# Patient Record
Sex: MALE | Race: WHITE | Employment: STUDENT | URBAN - METROPOLITAN AREA
[De-identification: names, ages, dates, MRNs, and addresses within clinical notes are randomized per-mention and may not be internally consistent; named-entity substitution may affect disease eponyms.]

---
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
## 2025-01-17 ENCOUNTER — TELEPHONE (OUTPATIENT)
Dept: PRIMARY CARE | Facility: CLINIC | Age: 25
End: 2025-01-17

## 2025-01-17 ENCOUNTER — APPOINTMENT (OUTPATIENT)
Dept: PRIMARY CARE | Facility: CLINIC | Age: 25
End: 2025-01-17
Payer: COMMERCIAL

## 2025-01-17 NOTE — TELEPHONE ENCOUNTER
Due to linette not in office today 01/17/2025 called and left vm for patient to reschedule on phone number

## 2025-01-20 ENCOUNTER — APPOINTMENT (OUTPATIENT)
Dept: PRIMARY CARE | Facility: CLINIC | Age: 25
End: 2025-01-20
Payer: COMMERCIAL

## 2025-01-20 VITALS
BODY MASS INDEX: 38.89 KG/M2 | HEIGHT: 74 IN | TEMPERATURE: 98 F | SYSTOLIC BLOOD PRESSURE: 158 MMHG | WEIGHT: 303 LBS | RESPIRATION RATE: 16 BRPM | DIASTOLIC BLOOD PRESSURE: 80 MMHG | HEART RATE: 75 BPM | OXYGEN SATURATION: 98 %

## 2025-01-20 DIAGNOSIS — F41.9 ANXIETY: ICD-10-CM

## 2025-01-20 DIAGNOSIS — I10 HYPERTENSION, UNSPECIFIED TYPE: Primary | ICD-10-CM

## 2025-01-20 DIAGNOSIS — Z13.9 SCREENING DUE: ICD-10-CM

## 2025-01-20 DIAGNOSIS — E66.812 CLASS 2 OBESITY WITH BODY MASS INDEX (BMI) OF 38.0 TO 38.9 IN ADULT, UNSPECIFIED OBESITY TYPE, UNSPECIFIED WHETHER SERIOUS COMORBIDITY PRESENT: ICD-10-CM

## 2025-01-20 PROCEDURE — 99203 OFFICE O/P NEW LOW 30 MIN: CPT | Performed by: NURSE PRACTITIONER

## 2025-01-20 RX ORDER — HYDROXYZINE HYDROCHLORIDE 10 MG/1
10 TABLET, FILM COATED ORAL 3 TIMES DAILY PRN
Qty: 30 TABLET | Refills: 0 | Status: SHIPPED | OUTPATIENT
Start: 2025-01-20 | End: 2025-02-19

## 2025-01-20 RX ORDER — ESCITALOPRAM OXALATE 10 MG/1
10 TABLET ORAL DAILY
Qty: 30 TABLET | Refills: 0 | Status: SHIPPED | OUTPATIENT
Start: 2025-01-20 | End: 2025-02-19

## 2025-01-20 RX ORDER — LOSARTAN POTASSIUM 25 MG/1
25 TABLET ORAL DAILY
Qty: 30 TABLET | Refills: 0 | Status: SHIPPED | OUTPATIENT
Start: 2025-01-20 | End: 2025-02-19

## 2025-01-20 ASSESSMENT — ENCOUNTER SYMPTOMS
FATIGUE: 0
POLYDIPSIA: 0
ABDOMINAL PAIN: 0
SORE THROAT: 0
SINUS PRESSURE: 0
VOMITING: 0
WHEEZING: 0
DIARRHEA: 0
LIGHT-HEADEDNESS: 0
EYE REDNESS: 0
FLANK PAIN: 0
POLYPHAGIA: 0
PALPITATIONS: 0
IRRITABILITY: 1
DIZZINESS: 0
SLEEP DISTURBANCE: 0
CHILLS: 0
DYSPHORIC MOOD: 0
NERVOUS/ANXIOUS: 1
BACK PAIN: 0
DYSURIA: 0
SINUS PAIN: 0
DEPRESSED MOOD: 0
SHORTNESS OF BREATH: 0
HEADACHES: 0
FEVER: 0
COUGH: 0
INSOMNIA: 0
DECREASED CONCENTRATION: 1
NAUSEA: 0
WEAKNESS: 0
DEPRESSION: 0
CONSTIPATION: 0

## 2025-01-20 NOTE — PROGRESS NOTES
Subjective   Patient ID: Darrell Velarde is a 24 y.o. male who presents for Establish Care.    Patient is being seen today to establish care and would like to discuss starting anxiety medication.     Anxiety  Presents for initial visit. Onset was 1 to 5 years ago. The problem has been gradually worsening. Symptoms include decreased concentration, excessive worry, irritability and nervous/anxious behavior. Patient reports no chest pain, depressed mood, dizziness, insomnia, nausea, palpitations or shortness of breath. Symptoms occur most days. The severity of symptoms is interfering with daily activities. The symptoms are aggravated by family issues. The quality of sleep is good.     Risk factors include a major life event and prior traumatic experience. His past medical history is significant for anxiety/panic attacks. Past treatments include nothing.        Review of Systems   Constitutional:  Positive for irritability. Negative for chills, fatigue and fever.   HENT:  Negative for congestion, ear pain, sinus pressure, sinus pain and sore throat.    Eyes:  Negative for redness and visual disturbance.   Respiratory:  Negative for cough, shortness of breath and wheezing.    Cardiovascular:  Negative for chest pain, palpitations and leg swelling.   Gastrointestinal:  Negative for abdominal pain, constipation, diarrhea, nausea and vomiting.   Endocrine: Negative for polydipsia, polyphagia and polyuria.   Genitourinary:  Negative for dysuria, flank pain, genital sores, penile discharge, scrotal swelling, testicular pain and urgency.   Musculoskeletal:  Negative for back pain.   Skin:  Negative for rash.   Neurological:  Negative for dizziness, weakness, light-headedness and headaches.   Psychiatric/Behavioral:  Positive for decreased concentration. Negative for dysphoric mood and sleep disturbance. The patient is nervous/anxious. The patient does not have insomnia.        Objective   /80 (BP Location: Left arm,  "Patient Position: Sitting, BP Cuff Size: Large adult)   Pulse 75   Temp 36.7 °C (98 °F) (Temporal)   Resp 16   Ht 1.88 m (6' 2\")   Wt 137 kg (303 lb)   SpO2 98%   BMI 38.90 kg/m²   BP recheck 160/90    Physical Exam  Vitals and nursing note reviewed.   Constitutional:       General: He is not in acute distress.     Appearance: Normal appearance. He is obese.   HENT:      Head: Normocephalic and atraumatic.      Right Ear: Tympanic membrane normal.      Left Ear: Tympanic membrane normal.      Nose: Nose normal. No congestion.      Mouth/Throat:      Mouth: Mucous membranes are moist.      Pharynx: Oropharynx is clear. No posterior oropharyngeal erythema.   Eyes:      Conjunctiva/sclera: Conjunctivae normal.      Pupils: Pupils are equal, round, and reactive to light.   Cardiovascular:      Rate and Rhythm: Normal rate and regular rhythm.      Heart sounds: Normal heart sounds.   Pulmonary:      Effort: Pulmonary effort is normal.      Breath sounds: Normal breath sounds.   Abdominal:      General: Abdomen is flat. Bowel sounds are normal.      Palpations: Abdomen is soft. There is no mass.      Tenderness: There is no abdominal tenderness. There is no right CVA tenderness, left CVA tenderness or guarding.   Musculoskeletal:      Right lower leg: No edema.      Left lower leg: No edema.   Lymphadenopathy:      Cervical: No cervical adenopathy.   Skin:     General: Skin is warm and dry.   Neurological:      Mental Status: He is oriented to person, place, and time.   Psychiatric:         Mood and Affect: Mood normal.         Behavior: Behavior normal.         Assessment/Plan   Problem List Items Addressed This Visit    None  Visit Diagnoses         Codes    Hypertension, unspecified type    -  Primary I10    Relevant Medications    losartan (Cozaar) 25 mg tablet    Other Relevant Orders    Comprehensive Metabolic Panel    CBC and Auto Differential    Anxiety     F41.9    Relevant Medications    escitalopram " (Lexapro) 10 mg tablet    hydrOXYzine HCL (Atarax) 10 mg tablet    Screening due     Z13.9    Relevant Orders    Lipid panel    Tsh With Reflex To Free T4 If Abnormal    Class 2 obesity with body mass index (BMI) of 38.0 to 38.9 in adult, unspecified obesity type, unspecified whether serious comorbidity present     E66.812, Z68.38        Anxiety: Start escitalopram daily. Hydroxyzine as needed. Educated on medications.  Recommended therapy- patient declined at this time but will think about it for the future.     HTN: /90 on recheck today. Encouraged low sodium diet and regular exercise. Start losartan daily. Check labs.     Follow up in 2 weeks for recheck, or sooner with any additional concerns.

## 2025-01-21 ENCOUNTER — APPOINTMENT (OUTPATIENT)
Dept: PRIMARY CARE | Facility: CLINIC | Age: 25
End: 2025-01-21
Payer: COMMERCIAL

## 2025-01-21 ENCOUNTER — LAB (OUTPATIENT)
Dept: LAB | Facility: LAB | Age: 25
End: 2025-01-21
Payer: COMMERCIAL

## 2025-01-21 DIAGNOSIS — I10 HYPERTENSION, UNSPECIFIED TYPE: ICD-10-CM

## 2025-01-21 DIAGNOSIS — Z13.9 SCREENING DUE: ICD-10-CM

## 2025-01-21 LAB
ALBUMIN SERPL BCP-MCNC: 4.8 G/DL (ref 3.4–5)
ALP SERPL-CCNC: 32 U/L (ref 33–120)
ALT SERPL W P-5'-P-CCNC: 23 U/L (ref 10–52)
ANION GAP SERPL CALC-SCNC: 9 MMOL/L (ref 10–20)
AST SERPL W P-5'-P-CCNC: 25 U/L (ref 9–39)
BASOPHILS # BLD AUTO: 0.04 X10*3/UL (ref 0–0.1)
BASOPHILS NFR BLD AUTO: 0.6 %
BILIRUB SERPL-MCNC: 0.8 MG/DL (ref 0–1.2)
BUN SERPL-MCNC: 13 MG/DL (ref 6–23)
CALCIUM SERPL-MCNC: 9.7 MG/DL (ref 8.6–10.3)
CHLORIDE SERPL-SCNC: 104 MMOL/L (ref 98–107)
CHOLEST SERPL-MCNC: 145 MG/DL (ref 0–199)
CHOLESTEROL/HDL RATIO: 2.4
CO2 SERPL-SCNC: 32 MMOL/L (ref 21–32)
CREAT SERPL-MCNC: 0.94 MG/DL (ref 0.5–1.3)
EGFRCR SERPLBLD CKD-EPI 2021: >90 ML/MIN/1.73M*2
EOSINOPHIL # BLD AUTO: 0.27 X10*3/UL (ref 0–0.7)
EOSINOPHIL NFR BLD AUTO: 4.2 %
ERYTHROCYTE [DISTWIDTH] IN BLOOD BY AUTOMATED COUNT: 11.6 % (ref 11.5–14.5)
GLUCOSE SERPL-MCNC: 93 MG/DL (ref 74–99)
HCT VFR BLD AUTO: 43.7 % (ref 41–52)
HDLC SERPL-MCNC: 60.1 MG/DL
HGB BLD-MCNC: 15.7 G/DL (ref 13.5–17.5)
IMM GRANULOCYTES # BLD AUTO: 0.01 X10*3/UL (ref 0–0.7)
IMM GRANULOCYTES NFR BLD AUTO: 0.2 % (ref 0–0.9)
LDLC SERPL CALC-MCNC: 65 MG/DL
LYMPHOCYTES # BLD AUTO: 3.19 X10*3/UL (ref 1.2–4.8)
LYMPHOCYTES NFR BLD AUTO: 49.1 %
MCH RBC QN AUTO: 30.6 PG (ref 26–34)
MCHC RBC AUTO-ENTMCNC: 35.9 G/DL (ref 32–36)
MCV RBC AUTO: 85 FL (ref 80–100)
MONOCYTES # BLD AUTO: 0.44 X10*3/UL (ref 0.1–1)
MONOCYTES NFR BLD AUTO: 6.8 %
NEUTROPHILS # BLD AUTO: 2.55 X10*3/UL (ref 1.2–7.7)
NEUTROPHILS NFR BLD AUTO: 39.1 %
NON HDL CHOLESTEROL: 85 MG/DL (ref 0–149)
NRBC BLD-RTO: 0 /100 WBCS (ref 0–0)
PLATELET # BLD AUTO: 183 X10*3/UL (ref 150–450)
POTASSIUM SERPL-SCNC: 4.7 MMOL/L (ref 3.5–5.3)
PROT SERPL-MCNC: 7.1 G/DL (ref 6.4–8.2)
RBC # BLD AUTO: 5.13 X10*6/UL (ref 4.5–5.9)
SODIUM SERPL-SCNC: 140 MMOL/L (ref 136–145)
TRIGL SERPL-MCNC: 98 MG/DL (ref 0–114)
TSH SERPL-ACNC: 2.83 MIU/L (ref 0.44–3.98)
VLDL: 20 MG/DL (ref 0–40)
WBC # BLD AUTO: 6.5 X10*3/UL (ref 4.4–11.3)

## 2025-01-21 PROCEDURE — 84443 ASSAY THYROID STIM HORMONE: CPT

## 2025-01-21 PROCEDURE — 85025 COMPLETE CBC W/AUTO DIFF WBC: CPT

## 2025-01-21 PROCEDURE — 80061 LIPID PANEL: CPT

## 2025-01-21 PROCEDURE — 80053 COMPREHEN METABOLIC PANEL: CPT

## 2025-02-03 ENCOUNTER — APPOINTMENT (OUTPATIENT)
Dept: PRIMARY CARE | Facility: CLINIC | Age: 25
End: 2025-02-03
Payer: COMMERCIAL

## 2025-02-03 VITALS
WEIGHT: 295 LBS | BODY MASS INDEX: 37.86 KG/M2 | SYSTOLIC BLOOD PRESSURE: 132 MMHG | DIASTOLIC BLOOD PRESSURE: 84 MMHG | HEIGHT: 74 IN | OXYGEN SATURATION: 99 % | TEMPERATURE: 97.9 F | RESPIRATION RATE: 16 BRPM | HEART RATE: 58 BPM

## 2025-02-03 DIAGNOSIS — F41.9 ANXIETY: Primary | ICD-10-CM

## 2025-02-03 DIAGNOSIS — E66.812 CLASS 2 OBESITY WITH BODY MASS INDEX (BMI) OF 37.0 TO 37.9 IN ADULT, UNSPECIFIED OBESITY TYPE, UNSPECIFIED WHETHER SERIOUS COMORBIDITY PRESENT: ICD-10-CM

## 2025-02-03 DIAGNOSIS — I10 HYPERTENSION, UNSPECIFIED TYPE: ICD-10-CM

## 2025-02-03 PROCEDURE — 99213 OFFICE O/P EST LOW 20 MIN: CPT | Performed by: NURSE PRACTITIONER

## 2025-02-03 RX ORDER — ESCITALOPRAM OXALATE 10 MG/1
10 TABLET ORAL DAILY
Qty: 30 TABLET | Refills: 2 | Status: SHIPPED | OUTPATIENT
Start: 2025-02-03 | End: 2025-05-04

## 2025-02-03 ASSESSMENT — ENCOUNTER SYMPTOMS
DIZZINESS: 0
FLANK PAIN: 0
SINUS PAIN: 0
HEADACHES: 0
WEAKNESS: 0
HYPERTENSION: 1
DYSURIA: 0
EYE REDNESS: 0
BACK PAIN: 0
IRRITABILITY: 0
POLYDIPSIA: 0
VOMITING: 0
PALPITATIONS: 0
DYSPHORIC MOOD: 0
SORE THROAT: 0
FEVER: 0
WHEEZING: 0
CONSTIPATION: 0
SLEEP DISTURBANCE: 0
COUGH: 0
DIARRHEA: 0
SINUS PRESSURE: 0
FATIGUE: 0
POLYPHAGIA: 0
NAUSEA: 0
LIGHT-HEADEDNESS: 0
SHORTNESS OF BREATH: 0
CHILLS: 0
DECREASED CONCENTRATION: 0
ABDOMINAL PAIN: 0
NERVOUS/ANXIOUS: 0

## 2025-03-18 ENCOUNTER — PATIENT MESSAGE (OUTPATIENT)
Dept: PRIMARY CARE | Facility: CLINIC | Age: 25
End: 2025-03-18
Payer: COMMERCIAL

## 2025-03-18 DIAGNOSIS — F41.9 ANXIETY: ICD-10-CM

## 2025-03-18 RX ORDER — ESCITALOPRAM OXALATE 10 MG/1
10 TABLET ORAL DAILY
Qty: 30 TABLET | Refills: 2 | Status: SHIPPED | OUTPATIENT
Start: 2025-03-18 | End: 2025-06-16

## 2025-05-05 ENCOUNTER — APPOINTMENT (OUTPATIENT)
Dept: PRIMARY CARE | Facility: CLINIC | Age: 25
End: 2025-05-05
Payer: COMMERCIAL

## 2025-05-09 ENCOUNTER — OFFICE VISIT (OUTPATIENT)
Dept: PRIMARY CARE | Facility: CLINIC | Age: 25
End: 2025-05-09
Payer: COMMERCIAL

## 2025-05-09 VITALS
SYSTOLIC BLOOD PRESSURE: 142 MMHG | TEMPERATURE: 98 F | BODY MASS INDEX: 40.43 KG/M2 | WEIGHT: 315 LBS | OXYGEN SATURATION: 97 % | RESPIRATION RATE: 16 BRPM | HEART RATE: 71 BPM | HEIGHT: 74 IN | DIASTOLIC BLOOD PRESSURE: 94 MMHG

## 2025-05-09 DIAGNOSIS — F41.9 ANXIETY: Primary | ICD-10-CM

## 2025-05-09 DIAGNOSIS — E66.813 CLASS 3 SEVERE OBESITY WITH BODY MASS INDEX (BMI) OF 40.0 TO 44.9 IN ADULT, UNSPECIFIED OBESITY TYPE, UNSPECIFIED WHETHER SERIOUS COMORBIDITY PRESENT: ICD-10-CM

## 2025-05-09 DIAGNOSIS — R03.0 WHITE COAT SYNDROME WITHOUT HYPERTENSION: ICD-10-CM

## 2025-05-09 PROCEDURE — 99214 OFFICE O/P EST MOD 30 MIN: CPT | Performed by: NURSE PRACTITIONER

## 2025-05-09 ASSESSMENT — ENCOUNTER SYMPTOMS
POLYDIPSIA: 0
DYSPHORIC MOOD: 0
NERVOUS/ANXIOUS: 0
DYSURIA: 0
LIGHT-HEADEDNESS: 0
NAUSEA: 0
SINUS PRESSURE: 0
COUGH: 0
CONSTIPATION: 0
SORE THROAT: 0
EYE REDNESS: 0
DIZZINESS: 0
VOMITING: 0
FATIGUE: 0
SLEEP DISTURBANCE: 0
PALPITATIONS: 0
FEVER: 0
WEAKNESS: 0
WHEEZING: 0
POLYPHAGIA: 0
BACK PAIN: 0
SINUS PAIN: 0
DECREASED CONCENTRATION: 0
DIARRHEA: 0
SHORTNESS OF BREATH: 0
HEADACHES: 0
FLANK PAIN: 0
CHILLS: 0
ABDOMINAL PAIN: 0

## 2025-05-09 NOTE — PROGRESS NOTES
Subjective   Patient ID: Darrell Velarde is a 24 y.o. male who presents for Hypertension.    Patient is being seen today for follow up on his elevated blood pressure, he denies having any concerns.       Hypertension  This is a new problem. The problem has been gradually improving since onset. The problem is controlled. Associated symptoms include anxiety. Pertinent negatives include no chest pain, headaches, palpitations, peripheral edema or shortness of breath. Past treatments include angiotensin blockers. Compliance problems include medication side effects.      Anxiety  Presents for follow-up visit. Patient reports no chest pain, decreased concentration, dizziness, excessive worry, irritability, nausea, nervous/anxious behavior, palpitations or shortness of breath. Symptoms occur rarely. The severity of symptoms is mild. The quality of sleep is good.    Patient feels that lexapro is working very well to control his anxiety.   He has been working out and watching his diet, he is still having a hard time losing weight.     He has checked his BP at home, most readings 120s/70s    Review of Systems   Constitutional:  Negative for chills, fatigue and fever.   HENT:  Negative for congestion, ear pain, sinus pressure, sinus pain and sore throat.    Eyes:  Negative for redness and visual disturbance.   Respiratory:  Negative for cough, shortness of breath and wheezing.    Cardiovascular:  Negative for chest pain, palpitations and leg swelling.   Gastrointestinal:  Negative for abdominal pain, constipation, diarrhea, nausea and vomiting.   Endocrine: Negative for polydipsia, polyphagia and polyuria.   Genitourinary:  Negative for dysuria, flank pain, genital sores, penile discharge, scrotal swelling, testicular pain and urgency.   Musculoskeletal:  Negative for back pain.   Skin:  Negative for rash.   Neurological:  Negative for dizziness, weakness, light-headedness and headaches.   Psychiatric/Behavioral:  Negative for  "decreased concentration, dysphoric mood and sleep disturbance. The patient is not nervous/anxious.        Objective   BP (!) 142/94 (BP Location: Left arm, Patient Position: Sitting, BP Cuff Size: Large adult)   Pulse 71   Temp 36.7 °C (98 °F) (Temporal)   Resp 16   Ht 1.88 m (6' 2\")   Wt 144 kg (318 lb)   SpO2 97%   BMI 40.83 kg/m²       Physical Exam  Vitals and nursing note reviewed.   Constitutional:       General: He is not in acute distress.     Appearance: Normal appearance. He is obese.   Cardiovascular:      Rate and Rhythm: Normal rate and regular rhythm.      Heart sounds: Normal heart sounds.   Pulmonary:      Effort: Pulmonary effort is normal.      Breath sounds: Normal breath sounds.   Musculoskeletal:      Right lower leg: No edema.      Left lower leg: No edema.   Skin:     General: Skin is warm and dry.   Neurological:      Mental Status: He is alert.   Psychiatric:         Mood and Affect: Mood normal.         Behavior: Behavior normal.         Assessment/Plan   Problem List Items Addressed This Visit    None  Visit Diagnoses         Codes      Anxiety    -  Primary F41.9      White coat syndrome without hypertension     R03.0      Class 3 severe obesity with body mass index (BMI) of 40.0 to 44.9 in adult, unspecified obesity type, unspecified whether serious comorbidity present     E66.813, Z68.41          Anxiety: Stable. Continue with lexapro.  White coat/HTN:  Recommended continue checking BP at home and follow up with elevated readings (<140/<90)  Discussed healthy diet/regular exercise.  Follow up in 4 months for recheck, or sooner with any additional concerns.            "

## 2025-07-21 ENCOUNTER — OFFICE VISIT (OUTPATIENT)
Dept: URGENT CARE | Age: 25
End: 2025-07-21
Payer: COMMERCIAL

## 2025-07-21 VITALS
SYSTOLIC BLOOD PRESSURE: 150 MMHG | OXYGEN SATURATION: 98 % | DIASTOLIC BLOOD PRESSURE: 100 MMHG | WEIGHT: 315 LBS | TEMPERATURE: 98.8 F | BODY MASS INDEX: 40.83 KG/M2 | RESPIRATION RATE: 17 BRPM | HEART RATE: 66 BPM

## 2025-07-21 DIAGNOSIS — I10 ESSENTIAL HYPERTENSION, BENIGN: Primary | ICD-10-CM

## 2025-07-21 DIAGNOSIS — J06.9 ACUTE URI: ICD-10-CM

## 2025-07-21 NOTE — PATIENT INSTRUCTIONS
Acute URI/Cough:  - Good oral hydration; avoid milk products   - Major's Vapor rub; humidifier; warm showers   - Take medications as prescribed   - Avoid triggers as much as possible   - Advised on s/s to seek emergent care for   - f/u with PCP in the next 3-5 days if no better     High Blood Pressure:  - Improved reading with manual check  - Has apt next month with PCP for repeat check  - Limit decongestant use, was taking OTC flu medication since he has been sick and advised him most likely has decongestant in it and causing BP to go up; monitor BP at home while on cough medication   - Monitor blood pressure closely    - Denies any associated symptoms with elevation in bp   - Advised on s/s to seek emergent care for   - Advised on dangers of uncontrolled HTN   - Encourage wt loss and lifestyle modifications   - DASH diet, low salt diet, increase exercise, walking at least 30 min daily is good

## 2025-07-21 NOTE — PROGRESS NOTES
"Subjective   Patient ID: Darrell Velarde is a 24 y.o. male. They present today with a chief complaint of No chief complaint on file..    History of Present Illness  Pt presents to the  with the c/o cough and congestion for the last 5 days. No fever, no sob, cp or pain with deep inspiration. No other associated symptoms or concerns to address at this time. Has been taking over the counter medications with minimal improvement in his symptoms.   Noted elevation in BP. BP currently being monitored by PCP. Per patient after getting  states \"I have put on a few pounds, I am working with my PCP to bring it down but had reaction to first medication.\" Pt states he is working out 2 hours a day is not eating fast food and trying to loose wt. Denies CP, lower ext swelling or SOB.       Illness      Past Medical History  Allergies as of 07/21/2025 - Reviewed 05/09/2025   Allergen Reaction Noted    Losartan Dizziness and Nausea/vomiting 02/03/2025       Prescriptions Prior to Admission[1]     Medical History[2]    Surgical History[3]     reports that he has never smoked. He has never used smokeless tobacco. He reports that he does not use drugs.    Review of Systems  Review of Systems  10 point ROS completed and all are negative other than what is stated in the current HPI                               Objective    There were no vitals filed for this visit.  No LMP for male patient.    Physical Exam  Vitals and nursing note reviewed.   Constitutional:       Appearance: Normal appearance. He is not ill-appearing or toxic-appearing.   HENT:      Nose: Congestion and rhinorrhea present.      Mouth/Throat:      Mouth: Mucous membranes are moist.      Pharynx: No oropharyngeal exudate or posterior oropharyngeal erythema.      Comments: (+)Postnasal discharge    Cardiovascular:      Rate and Rhythm: Normal rate and regular rhythm.   Pulmonary:      Effort: Pulmonary effort is normal.      Breath sounds: Normal breath sounds. "     Musculoskeletal:      Cervical back: No tenderness.   Lymphadenopathy:      Cervical: No cervical adenopathy.     Skin:     General: Skin is warm and dry.      Capillary Refill: Capillary refill takes less than 2 seconds.      Findings: No rash.     Neurological:      Mental Status: He is alert and oriented to person, place, and time.     Psychiatric:         Behavior: Behavior normal.         Procedures    Point of Care Test & Imaging Results from this visit  No results found for this visit on 07/21/25.   Imaging  No results found.    Cardiology, Vascular, and Other Imaging  No other imaging results found for the past 2 days      Diagnostic study results (if any) were reviewed by ALICIA Cabrera.    Assessment/Plan   Allergies, medications, history, and pertinent labs/EKGs/Imaging reviewed by ALICIA Cabrera.     Medical Decision Making  Acute URI/Cough:  - Good oral hydration; avoid milk products   - Major's Vapor rub; humidifier; warm showers   - Take medications as prescribed   - Avoid triggers as much as possible   - Advised on s/s to seek emergent care for   - f/u with PCP in the next 3-5 days if no better     High Blood Pressure:  - Improved reading with manual check  - Has apt next month with PCP for repeat check  - Limit decongestant use, was taking OTC flu medication since he has been sick and advised him most likely has decongestant in it and causing BP to go up; monitor BP at home while on cough medication   - Monitor blood pressure closely    - Denies any associated symptoms with elevation in bp   - Advised on s/s to seek emergent care for   - Advised on dangers of uncontrolled HTN   - Encourage wt loss and lifestyle modifications   - DASH diet, low salt diet, increase exercise, walking at least 30 min daily is good     At time of discharge patient was clinically well-appearing and HDS for outpatient management. The patient and/or family was educated regarding diagnosis,  supportive care, OTC and Rx medications. The patient and/or family was given the opportunity to ask questions prior to discharge.  They verbalized understanding of my discussion of the plans for treatment, expected course, indications to return to  or seek further evaluation in ED, and the need for timely follow up as directed.   They were provided with a work/school excuse if requested.  AVS provided to patient.  All questions were answered and the patient verbalized understanding of the plan of care for today.       Orders and Diagnoses  There are no diagnoses linked to this encounter.    Medical Admin Record      Patient disposition: Home    Electronically signed by ALICIA Cabrera  8:18 AM           [1] (Not in a hospital admission)  [2] No past medical history on file.  [3]   Past Surgical History:  Procedure Laterality Date    OTHER SURGICAL HISTORY  09/17/2019    Tonsillectomy with adenoidectomy    OTHER SURGICAL HISTORY  09/17/2019    Ear pressure equalization tube insertion bilateral

## 2025-08-04 ENCOUNTER — OFFICE VISIT (OUTPATIENT)
Dept: URGENT CARE | Age: 25
End: 2025-08-04
Payer: COMMERCIAL

## 2025-08-04 VITALS
SYSTOLIC BLOOD PRESSURE: 134 MMHG | TEMPERATURE: 98 F | HEART RATE: 73 BPM | OXYGEN SATURATION: 98 % | DIASTOLIC BLOOD PRESSURE: 87 MMHG | RESPIRATION RATE: 19 BRPM

## 2025-08-04 DIAGNOSIS — S76.911A MUSCLE STRAIN OF RIGHT THIGH, INITIAL ENCOUNTER: Primary | ICD-10-CM

## 2025-08-04 PROCEDURE — 99213 OFFICE O/P EST LOW 20 MIN: CPT | Performed by: NURSE PRACTITIONER

## 2025-08-04 ASSESSMENT — ENCOUNTER SYMPTOMS: PAIN: 1

## 2025-08-04 NOTE — LETTER
August 4, 2025     Patient: Darrell Velarde   YOB: 2000   Date of Visit: 8/4/2025       To Whom It May Concern:    Darrell Velarde was seen in my clinic on 8/4/2025 at 10:05 am. Please excuse Darrell for his absence from work on this day to make the appointment. Darrell may return on 8/5/2025    If you have any questions or concerns, please don't hesitate to call.         Sincerely,         Martha Bateman, HOMA-CNP        CC: No Recipients

## 2025-08-04 NOTE — PATIENT INSTRUCTIONS
Muscle Strain Inner Right Thigh:  - See printout  - Pain has improved since yesterday  - Continue with Tylenol; start Motrin as needed for pain as well  - Heat and Ice to the affected area  - Advised on s/s to seek emergent care for  - Rest the area; avoid any activity that will worsen the pain or prevent muscle healing  - Follow-up with PCP in the next 3-5 days if no better

## 2025-08-04 NOTE — PROGRESS NOTES
Subjective   Patient ID: Darrell Velarde is a 24 y.o. male. They present today with a chief complaint of Pain (R inner thigh pain x 3 days post working out nkt ).    History of Present Illness  Pt presents to the  for work note. Pt states he had to miss work today because of inner thigh injury while working out 3 days ago. Pain has improved per patient but could not sleep well the last 2 nights. Pt state pain is now a 4/10 and Tylenol has helped his pain. Denies calf pain or groin pain. No other associated symptoms or concerns to address at this time.       Pain      Past Medical History  Allergies as of 08/04/2025 - Reviewed 08/04/2025   Allergen Reaction Noted    Losartan Dizziness and Nausea/vomiting 02/03/2025       Prescriptions Prior to Admission[1]     Medical History[2]    Surgical History[3]     reports that he has never smoked. He has never used smokeless tobacco. He reports that he does not use drugs.    Review of Systems  Review of Systems  10 point ROS completed and all are negative other than what is stated in the current HPI                               Objective    Vitals:    08/04/25 1013   BP: 134/87   Pulse: 73   Resp: 19   Temp: 36.7 °C (98 °F)   SpO2: 98%     No LMP for male patient.    Physical Exam  Vitals and nursing note reviewed.   Constitutional:       Appearance: Normal appearance.     Cardiovascular:      Rate and Rhythm: Normal rate and regular rhythm.   Pulmonary:      Effort: Pulmonary effort is normal.      Breath sounds: Normal breath sounds.   Abdominal:      Comments: No inguinal hernia  to the right side     Musculoskeletal:         General: No swelling, tenderness or deformity.      Right lower leg: No edema.      Left lower leg: No edema.        Legs:       Comments: Pain to inner right thigh with sitting down and when trying to get up from seated position; no swelling; no bruising; no redness; no calf pain or tenderness     Skin:     General: Skin is warm and dry.       Capillary Refill: Capillary refill takes less than 2 seconds.     Neurological:      Mental Status: He is alert and oriented to person, place, and time.     Psychiatric:         Behavior: Behavior normal.         Procedures    Point of Care Test & Imaging Results from this visit  No results found for this visit on 08/04/25.   Imaging  No results found.    Cardiology, Vascular, and Other Imaging  No other imaging results found for the past 2 days      Diagnostic study results (if any) were reviewed by ALICIA Cabrera.    Assessment/Plan   Allergies, medications, history, and pertinent labs/EKGs/Imaging reviewed by ALICIA Cabrera.     Medical Decision Making  Muscle Strain Inner Right Thigh:  - See printout  - Pain has improved since yesterday  - Continue with Tylenol; start Motrin as needed for pain as well  - Heat and Ice to the affected area  - Advised on s/s to seek emergent care for  - Rest the area; avoid any activity that will worsen the pain or prevent muscle healing  - Follow-up with PCP in the next 3-5 days if no better    At time of discharge patient was clinically well-appearing and HDS for outpatient management. The patient and/or family was educated regarding diagnosis, supportive care, OTC and Rx medications. The patient and/or family was given the opportunity to ask questions prior to discharge.  They verbalized understanding of my discussion of the plans for treatment, expected course, indications to return to  or seek further evaluation in ED, and the need for timely follow up as directed.   They were provided with a work/school excuse if requested.  AVS provided to patient.  All questions were answered and the patient verbalized understanding of the plan of care for today.       Orders and Diagnoses  There are no diagnoses linked to this encounter.    Medical Admin Record      Patient disposition: Home    Electronically signed by ALICIA Cabrera  10:25  AM           [1] (Not in a hospital admission)  [2] No past medical history on file.  [3]   Past Surgical History:  Procedure Laterality Date    OTHER SURGICAL HISTORY  09/17/2019    Tonsillectomy with adenoidectomy    OTHER SURGICAL HISTORY  09/17/2019    Ear pressure equalization tube insertion bilateral

## 2025-08-04 NOTE — LETTER
August 4, 2025     Patient: Darrell Velarde   YOB: 2000   Date of Visit: 8/4/2025       To Whom It May Concern:    Darrell Velarde was seen in my clinic on 8/4/2025 at 10:05 am. Please excuse Darrell for his absence from work on this day to make the appointment.    If you have any questions or concerns, please don't hesitate to call.         Sincerely,         Martha Bateman, APRN-CNP        CC: No Recipients

## 2025-09-03 ENCOUNTER — OFFICE VISIT (OUTPATIENT)
Dept: URGENT CARE | Age: 25
End: 2025-09-03
Payer: COMMERCIAL

## 2025-09-03 VITALS
TEMPERATURE: 97.7 F | HEART RATE: 72 BPM | RESPIRATION RATE: 15 BRPM | DIASTOLIC BLOOD PRESSURE: 78 MMHG | SYSTOLIC BLOOD PRESSURE: 136 MMHG | OXYGEN SATURATION: 97 %

## 2025-09-03 DIAGNOSIS — R19.7 DIARRHEA, UNSPECIFIED TYPE: Primary | ICD-10-CM

## 2025-09-03 ASSESSMENT — ENCOUNTER SYMPTOMS: DIARRHEA: 1

## 2025-09-09 ENCOUNTER — APPOINTMENT (OUTPATIENT)
Dept: PRIMARY CARE | Facility: CLINIC | Age: 25
End: 2025-09-09
Payer: COMMERCIAL